# Patient Record
Sex: FEMALE | URBAN - METROPOLITAN AREA
[De-identification: names, ages, dates, MRNs, and addresses within clinical notes are randomized per-mention and may not be internally consistent; named-entity substitution may affect disease eponyms.]

---

## 2020-06-25 ENCOUNTER — APPOINTMENT (RX ONLY)
Dept: URBAN - METROPOLITAN AREA CLINIC 68 | Facility: CLINIC | Age: 55
Setting detail: DERMATOLOGY
End: 2020-06-25

## 2020-06-25 DIAGNOSIS — Z41.9 ENCOUNTER FOR PROCEDURE FOR PURPOSES OTHER THAN REMEDYING HEALTH STATE, UNSPECIFIED: ICD-10-CM

## 2020-06-25 PROCEDURE — ? BOTOX

## 2020-06-25 NOTE — PROCEDURE: BOTOX
Show Right And Left Periorbital Units: No
Periorbital Skin Units: 0
Dilution (U/0.1 Cc): 4
Show Additional Area 6: Yes
Consent: Written consent obtained. Risks include but not limited to lid/brow ptosis, bruising, swelling, diplopia, temporary effect, incomplete chemical denervation.
Show Inventory Tab: Hide
Post-Care Instructions: Patient instructed to not lie down for 2 hours and limit physical activity for 2 hours.
Detail Level: Detailed

## 2021-07-08 ENCOUNTER — APPOINTMENT (RX ONLY)
Dept: URBAN - METROPOLITAN AREA CLINIC 68 | Facility: CLINIC | Age: 56
Setting detail: DERMATOLOGY
End: 2021-07-08

## 2021-07-08 DIAGNOSIS — Z41.9 ENCOUNTER FOR PROCEDURE FOR PURPOSES OTHER THAN REMEDYING HEALTH STATE, UNSPECIFIED: ICD-10-CM

## 2021-07-08 PROCEDURE — ? BOTOX

## 2021-07-08 PROCEDURE — ? COSMETIC CONSULTATION: GENERAL

## 2021-07-08 NOTE — PROCEDURE: BOTOX
Show Additional Area 6: Yes
Levator Labii Superioris Units: 0
Show Right And Left Periorbital Units: No
Post-Care Instructions: Patient instructed to not lie down for 2 hours and limit physical activity for 2 hours.
Detail Level: Detailed
Consent: Written consent obtained. Risks include but not limited to lid/brow ptosis, bruising, swelling, diplopia, temporary effect, incomplete chemical denervation.
Show Inventory Tab: Hide
Dilution (U/0.1 Cc): 4

## 2021-08-04 ENCOUNTER — APPOINTMENT (RX ONLY)
Dept: URBAN - METROPOLITAN AREA CLINIC 68 | Facility: CLINIC | Age: 56
Setting detail: DERMATOLOGY
End: 2021-08-04

## 2021-08-04 DIAGNOSIS — Z41.9 ENCOUNTER FOR PROCEDURE FOR PURPOSES OTHER THAN REMEDYING HEALTH STATE, UNSPECIFIED: ICD-10-CM

## 2021-08-04 PROCEDURE — ? RF MICRONEEDLING

## 2021-08-04 NOTE — PROCEDURE: RF MICRONEEDLING
Treatment Number (Optional): 0
Information: You must select either a Location or Location Override for the laser information to render in the note
Topical Anesthesia Type: 23% lidocaine, 7% tetracaine
Laser: Endymed Intensif
Pulse Width In Msec (Optional): Please see photo attachment for settings
Depth In Mm: 0.1
Consent: Written consent obtained, risks reviewed including but not limited to pain, scarring, infection and incomplete improvement.  Patient understands the procedure is cosmetic in nature and will require out of pocket payment.
Anesthesia Type: 1% lidocaine with 1:100,000 epinephrine
Indication: dyschromia
Post-Care Instructions: After the procedure, take precautions agains sun exposure. Do not apply sunscreen for 12 hours after the procedure. Do not apply make-up for 12 hours after the procedure. Avoid alcohol based toners for 10-14 days. After 2-3 days patients can return to their regular skin regimen.
Laser Override (Optional): [id number]
Location #1: Upper Lip
Indication: moderate to severe perioral rhytides
Detail Level: Zone
Pre-Procedure Text: The treatment areas were cleansed in the usual fashion and treatment proceeded as outlined above.

## 2021-08-11 ENCOUNTER — APPOINTMENT (RX ONLY)
Dept: URBAN - METROPOLITAN AREA CLINIC 68 | Facility: CLINIC | Age: 56
Setting detail: DERMATOLOGY
End: 2021-08-11

## 2022-02-15 ENCOUNTER — APPOINTMENT (RX ONLY)
Dept: URBAN - METROPOLITAN AREA CLINIC 68 | Facility: CLINIC | Age: 57
Setting detail: DERMATOLOGY
End: 2022-02-15

## 2022-02-15 DIAGNOSIS — Z41.9 ENCOUNTER FOR PROCEDURE FOR PURPOSES OTHER THAN REMEDYING HEALTH STATE, UNSPECIFIED: ICD-10-CM

## 2022-02-15 PROCEDURE — ? BOTOX

## 2022-02-15 NOTE — PROCEDURE: BOTOX
Show Anterior Platysmal Band Units: Yes
Additional Area 2 Units: 0
Show Right And Left Periorbital Units: No
Detail Level: Detailed
Dilution (U/0.1 Cc): 4
Consent: Written consent obtained. Risks include but not limited to lid/brow ptosis, bruising, swelling, diplopia, temporary effect, incomplete chemical denervation.
Post-Care Instructions: Patient instructed to not lie down for 2 hours and limit physical activity for 2 hours.
Show Inventory Tab: Hide